# Patient Record
Sex: MALE | Race: WHITE | NOT HISPANIC OR LATINO | URBAN - METROPOLITAN AREA
[De-identification: names, ages, dates, MRNs, and addresses within clinical notes are randomized per-mention and may not be internally consistent; named-entity substitution may affect disease eponyms.]

---

## 2017-07-31 ENCOUNTER — OFFICE (OUTPATIENT)
Dept: URBAN - METROPOLITAN AREA CLINIC 11 | Facility: CLINIC | Age: 57
End: 2017-07-31

## 2017-07-31 VITALS
HEIGHT: 64 IN | DIASTOLIC BLOOD PRESSURE: 62 MMHG | WEIGHT: 156 LBS | SYSTOLIC BLOOD PRESSURE: 127 MMHG | HEART RATE: 48 BPM

## 2017-07-31 DIAGNOSIS — K21.9 GASTRO-ESOPHAGEAL REFLUX DISEASE WITHOUT ESOPHAGITIS: ICD-10-CM

## 2017-07-31 DIAGNOSIS — R14.0 ABDOMINAL DISTENSION (GASEOUS): ICD-10-CM

## 2017-07-31 PROCEDURE — 99213 OFFICE O/P EST LOW 20 MIN: CPT | Performed by: INTERNAL MEDICINE

## 2018-07-23 ENCOUNTER — OFFICE (OUTPATIENT)
Dept: URBAN - METROPOLITAN AREA CLINIC 11 | Facility: CLINIC | Age: 58
End: 2018-07-23

## 2018-07-23 VITALS
WEIGHT: 160 LBS | DIASTOLIC BLOOD PRESSURE: 72 MMHG | SYSTOLIC BLOOD PRESSURE: 140 MMHG | HEIGHT: 64 IN | HEART RATE: 48 BPM

## 2018-07-23 DIAGNOSIS — R14.0 ABDOMINAL DISTENSION (GASEOUS): ICD-10-CM

## 2018-07-23 DIAGNOSIS — K21.9 GASTRO-ESOPHAGEAL REFLUX DISEASE WITHOUT ESOPHAGITIS: ICD-10-CM

## 2018-07-23 DIAGNOSIS — K44.9 DIAPHRAGMATIC HERNIA WITHOUT OBSTRUCTION OR GANGRENE: ICD-10-CM

## 2018-07-23 PROCEDURE — 99213 OFFICE O/P EST LOW 20 MIN: CPT | Performed by: INTERNAL MEDICINE

## 2018-07-23 RX ORDER — SUCRALFATE 1 G/1
TABLET ORAL
Qty: 180 | Refills: 3 | Status: ACTIVE

## 2018-07-23 RX ORDER — PANTOPRAZOLE 40 MG/1
TABLET, DELAYED RELEASE ORAL
Qty: 90 | Refills: 3 | Status: ACTIVE

## 2018-07-23 NOTE — SERVICEHPINOTES
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  Status post hiatal hernia repair many years ago.  Recently hospitalized while on vacation with  probable upper GI bleeding severe anemia.  Endoscopy with a hiatal hernia and Bustillo's esophagus.  No active bleeding noted.  Given 2 units of packed red blood cells.  Discharged home on sucralfate and Protonix as well as MiraLax.  Feeling better

## 2018-10-17 ENCOUNTER — OFFICE (OUTPATIENT)
Dept: URBAN - METROPOLITAN AREA CLINIC 11 | Facility: CLINIC | Age: 58
End: 2018-10-17

## 2018-10-17 VITALS
DIASTOLIC BLOOD PRESSURE: 90 MMHG | HEIGHT: 64 IN | HEART RATE: 82 BPM | SYSTOLIC BLOOD PRESSURE: 130 MMHG | WEIGHT: 158 LBS

## 2018-10-17 DIAGNOSIS — K21.9 GASTRO-ESOPHAGEAL REFLUX DISEASE WITHOUT ESOPHAGITIS: ICD-10-CM

## 2018-10-17 DIAGNOSIS — K44.9 DIAPHRAGMATIC HERNIA WITHOUT OBSTRUCTION OR GANGRENE: ICD-10-CM

## 2018-10-17 DIAGNOSIS — E53.8 DEFICIENCY OF OTHER SPECIFIED B GROUP VITAMINS: ICD-10-CM

## 2018-10-17 DIAGNOSIS — D64.9 ANEMIA, UNSPECIFIED: ICD-10-CM

## 2018-10-17 DIAGNOSIS — R14.0 ABDOMINAL DISTENSION (GASEOUS): ICD-10-CM

## 2018-10-17 LAB
CBC, PLATELET, NO DIFFERENTIAL: HEMATOCRIT: 44.1 % (ref 37.5–51)
CBC, PLATELET, NO DIFFERENTIAL: HEMOGLOBIN: 14.7 G/DL (ref 13–17.7)
CBC, PLATELET, NO DIFFERENTIAL: MCH: 29.1 PG (ref 26.6–33)
CBC, PLATELET, NO DIFFERENTIAL: MCHC: 33.3 G/DL (ref 31.5–35.7)
CBC, PLATELET, NO DIFFERENTIAL: MCV: 87 FL (ref 79–97)
CBC, PLATELET, NO DIFFERENTIAL: PLATELETS: 216 X10E3/UL (ref 150–379)
CBC, PLATELET, NO DIFFERENTIAL: RBC: 5.06 X10E6/UL (ref 4.14–5.8)
CBC, PLATELET, NO DIFFERENTIAL: RDW: 13.9 % (ref 12.3–15.4)
CBC, PLATELET, NO DIFFERENTIAL: WBC: 5.7 X10E3/UL (ref 3.4–10.8)
FE+TIBC+FER: FERRITIN, SERUM: 20 NG/ML — LOW (ref 30–400)
FE+TIBC+FER: IRON BIND.CAP.(TIBC): 414 UG/DL (ref 250–450)
FE+TIBC+FER: IRON SATURATION: 18 % (ref 15–55)
FE+TIBC+FER: IRON: 76 UG/DL (ref 38–169)
FE+TIBC+FER: UIBC: 338 UG/DL (ref 111–343)
VITAMIN B12 AND FOLATE: FOLATE (FOLIC ACID), SERUM: 9.1 NG/ML (ref 3–?)
VITAMIN B12 AND FOLATE: VITAMIN B12: 256 PG/ML (ref 232–1245)

## 2018-10-17 PROCEDURE — 99213 OFFICE O/P EST LOW 20 MIN: CPT | Mod: 25 | Performed by: INTERNAL MEDICINE

## 2018-10-17 PROCEDURE — 36415 COLL VENOUS BLD VENIPUNCTURE: CPT | Performed by: INTERNAL MEDICINE

## 2019-02-20 ENCOUNTER — OFFICE (OUTPATIENT)
Dept: URBAN - METROPOLITAN AREA CLINIC 11 | Facility: CLINIC | Age: 59
End: 2019-02-20

## 2019-02-20 VITALS
HEART RATE: 45 BPM | SYSTOLIC BLOOD PRESSURE: 148 MMHG | DIASTOLIC BLOOD PRESSURE: 80 MMHG | HEIGHT: 64 IN | WEIGHT: 156 LBS

## 2019-02-20 DIAGNOSIS — E53.8 DEFICIENCY OF OTHER SPECIFIED B GROUP VITAMINS: ICD-10-CM

## 2019-02-20 DIAGNOSIS — K22.70 BARRETT'S ESOPHAGUS WITHOUT DYSPLASIA: ICD-10-CM

## 2019-02-20 DIAGNOSIS — D64.9 ANEMIA, UNSPECIFIED: ICD-10-CM

## 2019-02-20 DIAGNOSIS — K44.9 DIAPHRAGMATIC HERNIA WITHOUT OBSTRUCTION OR GANGRENE: ICD-10-CM

## 2019-02-20 DIAGNOSIS — R14.0 ABDOMINAL DISTENSION (GASEOUS): ICD-10-CM

## 2019-02-20 LAB
CBC, PLATELET, NO DIFFERENTIAL: HEMATOCRIT: 42.2 % (ref 37.5–51)
CBC, PLATELET, NO DIFFERENTIAL: HEMOGLOBIN: 14.2 G/DL (ref 13–17.7)
CBC, PLATELET, NO DIFFERENTIAL: MCH: 29.9 PG (ref 26.6–33)
CBC, PLATELET, NO DIFFERENTIAL: MCHC: 33.6 G/DL (ref 31.5–35.7)
CBC, PLATELET, NO DIFFERENTIAL: MCV: 89 FL (ref 79–97)
CBC, PLATELET, NO DIFFERENTIAL: PLATELETS: 222 X10E3/UL (ref 150–379)
CBC, PLATELET, NO DIFFERENTIAL: RBC: 4.75 X10E6/UL (ref 4.14–5.8)
CBC, PLATELET, NO DIFFERENTIAL: RDW: 13.9 % (ref 12.3–15.4)
CBC, PLATELET, NO DIFFERENTIAL: WBC: 5.8 X10E3/UL (ref 3.4–10.8)
FE+TIBC+FER: FERRITIN, SERUM: 32 NG/ML (ref 30–400)
FE+TIBC+FER: IRON BIND.CAP.(TIBC): 353 UG/DL (ref 250–450)
FE+TIBC+FER: IRON SATURATION: 29 % (ref 15–55)
FE+TIBC+FER: IRON: 104 UG/DL (ref 38–169)
FE+TIBC+FER: UIBC: 249 UG/DL (ref 111–343)

## 2019-02-20 PROCEDURE — 99213 OFFICE O/P EST LOW 20 MIN: CPT | Performed by: INTERNAL MEDICINE

## 2019-02-20 RX ORDER — SUCRALFATE 1 G/1
TABLET ORAL
Qty: 180 | Refills: 3 | Status: ACTIVE

## 2019-02-20 RX ORDER — PANTOPRAZOLE 40 MG/1
TABLET, DELAYED RELEASE ORAL
Qty: 90 | Refills: 3 | Status: ACTIVE

## 2019-08-21 ENCOUNTER — OFFICE (OUTPATIENT)
Dept: URBAN - METROPOLITAN AREA CLINIC 11 | Facility: CLINIC | Age: 59
End: 2019-08-21

## 2019-08-21 VITALS
DIASTOLIC BLOOD PRESSURE: 75 MMHG | HEART RATE: 42 BPM | SYSTOLIC BLOOD PRESSURE: 140 MMHG | WEIGHT: 158 LBS | HEIGHT: 64 IN

## 2019-08-21 DIAGNOSIS — E53.8 DEFICIENCY OF OTHER SPECIFIED B GROUP VITAMINS: ICD-10-CM

## 2019-08-21 DIAGNOSIS — R14.0 ABDOMINAL DISTENSION (GASEOUS): ICD-10-CM

## 2019-08-21 DIAGNOSIS — G40.909 EPILEPSY, UNSPECIFIED, NOT INTRACTABLE, WITHOUT STATUS EPILE: ICD-10-CM

## 2019-08-21 DIAGNOSIS — K21.9 GASTRO-ESOPHAGEAL REFLUX DISEASE WITHOUT ESOPHAGITIS: ICD-10-CM

## 2019-08-21 DIAGNOSIS — K44.9 DIAPHRAGMATIC HERNIA WITHOUT OBSTRUCTION OR GANGRENE: ICD-10-CM

## 2019-08-21 DIAGNOSIS — K22.70 BARRETT'S ESOPHAGUS WITHOUT DYSPLASIA: ICD-10-CM

## 2019-08-21 DIAGNOSIS — D64.9 ANEMIA, UNSPECIFIED: ICD-10-CM

## 2019-08-21 LAB
CBC, PLATELET, NO DIFFERENTIAL: HEMATOCRIT: 44 % (ref 37.5–51)
CBC, PLATELET, NO DIFFERENTIAL: HEMOGLOBIN: 14.4 G/DL (ref 13–17.7)
CBC, PLATELET, NO DIFFERENTIAL: MCH: 29.9 PG (ref 26.6–33)
CBC, PLATELET, NO DIFFERENTIAL: MCHC: 32.7 G/DL (ref 31.5–35.7)
CBC, PLATELET, NO DIFFERENTIAL: MCV: 91 FL (ref 79–97)
CBC, PLATELET, NO DIFFERENTIAL: PLATELETS: 238 X10E3/UL (ref 150–450)
CBC, PLATELET, NO DIFFERENTIAL: RBC: 4.82 X10E6/UL (ref 4.14–5.8)
CBC, PLATELET, NO DIFFERENTIAL: RDW: 13.6 % (ref 12.3–15.4)
CBC, PLATELET, NO DIFFERENTIAL: WBC: 6 X10E3/UL (ref 3.4–10.8)
FE+TIBC+FER: FERRITIN, SERUM: 43 NG/ML (ref 30–400)
FE+TIBC+FER: IRON BIND.CAP.(TIBC): 356 UG/DL (ref 250–450)
FE+TIBC+FER: IRON SATURATION: 50 % (ref 15–55)
FE+TIBC+FER: IRON: 178 UG/DL — HIGH (ref 38–169)
FE+TIBC+FER: UIBC: 178 UG/DL (ref 111–343)
HEPATIC FUNCTION PANEL (7): ALBUMIN: 5 G/DL — HIGH (ref 3.6–4.8)
HEPATIC FUNCTION PANEL (7): ALKALINE PHOSPHATASE: 83 IU/L (ref 39–117)
HEPATIC FUNCTION PANEL (7): ALT (SGPT): 22 IU/L (ref 0–44)
HEPATIC FUNCTION PANEL (7): AST (SGOT): 23 IU/L (ref 0–40)
HEPATIC FUNCTION PANEL (7): BILIRUBIN, DIRECT: 0.11 MG/DL (ref 0–0.4)
HEPATIC FUNCTION PANEL (7): BILIRUBIN, TOTAL: 0.3 MG/DL (ref 0–1.2)
HEPATIC FUNCTION PANEL (7): PROTEIN, TOTAL: 7.6 G/DL (ref 6–8.5)

## 2019-08-21 PROCEDURE — 99213 OFFICE O/P EST LOW 20 MIN: CPT | Performed by: INTERNAL MEDICINE

## 2020-02-21 ENCOUNTER — OFFICE (OUTPATIENT)
Dept: URBAN - METROPOLITAN AREA CLINIC 11 | Facility: CLINIC | Age: 60
End: 2020-02-21

## 2020-02-21 VITALS
DIASTOLIC BLOOD PRESSURE: 61 MMHG | HEIGHT: 64 IN | SYSTOLIC BLOOD PRESSURE: 156 MMHG | WEIGHT: 167 LBS | HEART RATE: 53 BPM

## 2020-02-21 DIAGNOSIS — R14.0 ABDOMINAL DISTENSION (GASEOUS): ICD-10-CM

## 2020-02-21 DIAGNOSIS — E53.8 DEFICIENCY OF OTHER SPECIFIED B GROUP VITAMINS: ICD-10-CM

## 2020-02-21 DIAGNOSIS — K44.9 DIAPHRAGMATIC HERNIA WITHOUT OBSTRUCTION OR GANGRENE: ICD-10-CM

## 2020-02-21 DIAGNOSIS — D64.9 ANEMIA, UNSPECIFIED: ICD-10-CM

## 2020-02-21 DIAGNOSIS — K22.70 BARRETT'S ESOPHAGUS WITHOUT DYSPLASIA: ICD-10-CM

## 2020-02-21 LAB
BASIC METABOLIC PANEL (8): BUN/CREATININE RATIO: 22 (ref 10–24)
BASIC METABOLIC PANEL (8): BUN: 19 MG/DL (ref 8–27)
BASIC METABOLIC PANEL (8): CALCIUM: 9.5 MG/DL (ref 8.6–10.2)
BASIC METABOLIC PANEL (8): CARBON DIOXIDE, TOTAL: 21 MMOL/L (ref 20–29)
BASIC METABOLIC PANEL (8): CHLORIDE: 104 MMOL/L (ref 96–106)
BASIC METABOLIC PANEL (8): CREATININE: 0.86 MG/DL (ref 0.76–1.27)
BASIC METABOLIC PANEL (8): EGFR IF AFRICN AM: 108 ML/MIN/1.73 (ref 59–?)
BASIC METABOLIC PANEL (8): EGFR IF NONAFRICN AM: 94 ML/MIN/1.73 (ref 59–?)
BASIC METABOLIC PANEL (8): GLUCOSE: 88 MG/DL (ref 65–99)
BASIC METABOLIC PANEL (8): POTASSIUM: 4.7 MMOL/L (ref 3.5–5.2)
BASIC METABOLIC PANEL (8): SODIUM: 141 MMOL/L (ref 134–144)
CBC, PLATELET, NO DIFFERENTIAL: HEMATOCRIT: 42.2 % (ref 37.5–51)
CBC, PLATELET, NO DIFFERENTIAL: HEMOGLOBIN: 14 G/DL (ref 13–17.7)
CBC, PLATELET, NO DIFFERENTIAL: MCH: 30.2 PG (ref 26.6–33)
CBC, PLATELET, NO DIFFERENTIAL: MCHC: 33.2 G/DL (ref 31.5–35.7)
CBC, PLATELET, NO DIFFERENTIAL: MCV: 91 FL (ref 79–97)
CBC, PLATELET, NO DIFFERENTIAL: PLATELETS: 235 X10E3/UL (ref 150–450)
CBC, PLATELET, NO DIFFERENTIAL: RBC: 4.63 X10E6/UL (ref 4.14–5.8)
CBC, PLATELET, NO DIFFERENTIAL: RDW: 14 % (ref 11.6–15.4)
CBC, PLATELET, NO DIFFERENTIAL: WBC: 7.2 X10E3/UL (ref 3.4–10.8)
FE+TIBC+FER: FERRITIN, SERUM: 49 NG/ML (ref 30–400)
FE+TIBC+FER: IRON BIND.CAP.(TIBC): 322 UG/DL (ref 250–450)
FE+TIBC+FER: IRON SATURATION: 34 % (ref 15–55)
FE+TIBC+FER: IRON: 109 UG/DL (ref 38–169)
FE+TIBC+FER: UIBC: 213 UG/DL (ref 111–343)
VITAMIN B12 AND FOLATE: FOLATE (FOLIC ACID), SERUM: 9.4 NG/ML (ref 3–?)
VITAMIN B12 AND FOLATE: VITAMIN B12: >2000 PG/ML — HIGH

## 2020-02-21 PROCEDURE — 99213 OFFICE O/P EST LOW 20 MIN: CPT | Performed by: INTERNAL MEDICINE

## 2020-08-28 ENCOUNTER — OFFICE (OUTPATIENT)
Dept: URBAN - METROPOLITAN AREA CLINIC 11 | Facility: CLINIC | Age: 60
End: 2020-08-28
Payer: MEDICARE

## 2020-08-28 VITALS
SYSTOLIC BLOOD PRESSURE: 161 MMHG | OXYGEN SATURATION: 99 % | DIASTOLIC BLOOD PRESSURE: 98 MMHG | WEIGHT: 158 LBS | HEART RATE: 71 BPM | HEIGHT: 64 IN

## 2020-08-28 DIAGNOSIS — R14.0 ABDOMINAL DISTENSION (GASEOUS): ICD-10-CM

## 2020-08-28 DIAGNOSIS — D64.9 ANEMIA, UNSPECIFIED: ICD-10-CM

## 2020-08-28 DIAGNOSIS — E53.8 DEFICIENCY OF OTHER SPECIFIED B GROUP VITAMINS: ICD-10-CM

## 2020-08-28 DIAGNOSIS — K22.70 BARRETT'S ESOPHAGUS WITHOUT DYSPLASIA: ICD-10-CM

## 2020-08-28 LAB
CBC, PLATELET, NO DIFFERENTIAL: HEMATOCRIT: 40.9 % (ref 37.5–51)
CBC, PLATELET, NO DIFFERENTIAL: HEMOGLOBIN: 14.2 G/DL (ref 13–17.7)
CBC, PLATELET, NO DIFFERENTIAL: MCH: 31.1 PG (ref 26.6–33)
CBC, PLATELET, NO DIFFERENTIAL: MCHC: 34.7 G/DL (ref 31.5–35.7)
CBC, PLATELET, NO DIFFERENTIAL: MCV: 90 FL (ref 79–97)
CBC, PLATELET, NO DIFFERENTIAL: NRBC: (no result)
CBC, PLATELET, NO DIFFERENTIAL: PLATELETS: 228 X10E3/UL (ref 150–450)
CBC, PLATELET, NO DIFFERENTIAL: RBC: 4.57 X10E6/UL (ref 4.14–5.8)
CBC, PLATELET, NO DIFFERENTIAL: RDW: 12.2 % (ref 11.6–15.4)
CBC, PLATELET, NO DIFFERENTIAL: WBC: 5.6 X10E3/UL (ref 3.4–10.8)

## 2020-08-28 PROCEDURE — 99214 OFFICE O/P EST MOD 30 MIN: CPT | Mod: 25 | Performed by: INTERNAL MEDICINE

## 2020-08-28 PROCEDURE — 96372 THER/PROPH/DIAG INJ SC/IM: CPT | Performed by: INTERNAL MEDICINE

## 2021-03-03 ENCOUNTER — OFFICE (OUTPATIENT)
Dept: URBAN - METROPOLITAN AREA CLINIC 11 | Facility: CLINIC | Age: 61
End: 2021-03-03

## 2021-03-03 VITALS
OXYGEN SATURATION: 97 % | SYSTOLIC BLOOD PRESSURE: 122 MMHG | HEIGHT: 64 IN | HEART RATE: 78 BPM | DIASTOLIC BLOOD PRESSURE: 88 MMHG | WEIGHT: 163 LBS

## 2021-03-03 DIAGNOSIS — K21.9 GASTRO-ESOPHAGEAL REFLUX DISEASE WITHOUT ESOPHAGITIS: ICD-10-CM

## 2021-03-03 DIAGNOSIS — R14.0 ABDOMINAL DISTENSION (GASEOUS): ICD-10-CM

## 2021-03-03 DIAGNOSIS — G40.909 EPILEPSY, UNSPECIFIED, NOT INTRACTABLE, WITHOUT STATUS EPILE: ICD-10-CM

## 2021-03-03 DIAGNOSIS — K44.9 DIAPHRAGMATIC HERNIA WITHOUT OBSTRUCTION OR GANGRENE: ICD-10-CM

## 2021-03-03 DIAGNOSIS — K22.70 BARRETT'S ESOPHAGUS WITHOUT DYSPLASIA: ICD-10-CM

## 2021-03-03 DIAGNOSIS — E53.8 DEFICIENCY OF OTHER SPECIFIED B GROUP VITAMINS: ICD-10-CM

## 2021-03-03 PROCEDURE — 99214 OFFICE O/P EST MOD 30 MIN: CPT | Performed by: INTERNAL MEDICINE

## 2021-03-03 RX ORDER — PANTOPRAZOLE 40 MG/1
TABLET, DELAYED RELEASE ORAL
Qty: 90 | Refills: 3 | Status: ACTIVE

## 2021-03-03 RX ORDER — SUCRALFATE 1 G/1
TABLET ORAL
Qty: 180 | Refills: 3 | Status: ACTIVE

## 2021-08-23 ENCOUNTER — OFFICE (OUTPATIENT)
Dept: URBAN - METROPOLITAN AREA PATHOLOGY 22 | Facility: PATHOLOGY | Age: 61
End: 2021-08-23
Payer: MEDICARE

## 2021-08-23 ENCOUNTER — AMBULATORY SURGICAL CENTER (OUTPATIENT)
Dept: URBAN - METROPOLITAN AREA SURGERY 3 | Facility: SURGERY | Age: 61
End: 2021-08-23

## 2021-08-23 ENCOUNTER — AMBULATORY SURGICAL CENTER (OUTPATIENT)
Dept: URBAN - METROPOLITAN AREA SURGERY 3 | Facility: SURGERY | Age: 61
End: 2021-08-23
Payer: MEDICARE

## 2021-08-23 VITALS
RESPIRATION RATE: 19 BRPM | WEIGHT: 163 LBS | OXYGEN SATURATION: 90 % | OXYGEN SATURATION: 91 % | WEIGHT: 163 LBS | TEMPERATURE: 98 F | HEIGHT: 64 IN | OXYGEN SATURATION: 90 % | OXYGEN SATURATION: 98 % | DIASTOLIC BLOOD PRESSURE: 73 MMHG | SYSTOLIC BLOOD PRESSURE: 158 MMHG | RESPIRATION RATE: 20 BRPM | OXYGEN SATURATION: 91 % | DIASTOLIC BLOOD PRESSURE: 72 MMHG | RESPIRATION RATE: 18 BRPM | DIASTOLIC BLOOD PRESSURE: 89 MMHG | HEART RATE: 80 BPM | SYSTOLIC BLOOD PRESSURE: 116 MMHG | SYSTOLIC BLOOD PRESSURE: 116 MMHG | SYSTOLIC BLOOD PRESSURE: 147 MMHG | HEART RATE: 89 BPM | DIASTOLIC BLOOD PRESSURE: 83 MMHG | DIASTOLIC BLOOD PRESSURE: 89 MMHG | RESPIRATION RATE: 20 BRPM | SYSTOLIC BLOOD PRESSURE: 144 MMHG | OXYGEN SATURATION: 89 % | SYSTOLIC BLOOD PRESSURE: 147 MMHG | RESPIRATION RATE: 20 BRPM | OXYGEN SATURATION: 100 % | DIASTOLIC BLOOD PRESSURE: 83 MMHG | DIASTOLIC BLOOD PRESSURE: 73 MMHG | TEMPERATURE: 97.3 F | OXYGEN SATURATION: 98 % | TEMPERATURE: 98 F | DIASTOLIC BLOOD PRESSURE: 72 MMHG | SYSTOLIC BLOOD PRESSURE: 158 MMHG | OXYGEN SATURATION: 100 % | RESPIRATION RATE: 19 BRPM | HEART RATE: 89 BPM | HEART RATE: 80 BPM | HEART RATE: 80 BPM | WEIGHT: 163 LBS | DIASTOLIC BLOOD PRESSURE: 88 MMHG | OXYGEN SATURATION: 91 % | HEIGHT: 64 IN | RESPIRATION RATE: 19 BRPM | RESPIRATION RATE: 18 BRPM | DIASTOLIC BLOOD PRESSURE: 73 MMHG | HEART RATE: 89 BPM | DIASTOLIC BLOOD PRESSURE: 72 MMHG | OXYGEN SATURATION: 98 % | DIASTOLIC BLOOD PRESSURE: 89 MMHG | OXYGEN SATURATION: 90 % | SYSTOLIC BLOOD PRESSURE: 144 MMHG | OXYGEN SATURATION: 89 % | SYSTOLIC BLOOD PRESSURE: 147 MMHG | SYSTOLIC BLOOD PRESSURE: 158 MMHG | TEMPERATURE: 98 F | SYSTOLIC BLOOD PRESSURE: 116 MMHG | SYSTOLIC BLOOD PRESSURE: 125 MMHG | SYSTOLIC BLOOD PRESSURE: 125 MMHG | DIASTOLIC BLOOD PRESSURE: 88 MMHG | HEART RATE: 60 BPM | DIASTOLIC BLOOD PRESSURE: 83 MMHG | HEART RATE: 60 BPM | OXYGEN SATURATION: 100 % | HEART RATE: 97 BPM | RESPIRATION RATE: 18 BRPM | TEMPERATURE: 97.3 F | TEMPERATURE: 97.3 F | SYSTOLIC BLOOD PRESSURE: 125 MMHG | HEART RATE: 97 BPM | OXYGEN SATURATION: 89 % | HEART RATE: 97 BPM | DIASTOLIC BLOOD PRESSURE: 88 MMHG | HEART RATE: 60 BPM | SYSTOLIC BLOOD PRESSURE: 144 MMHG | HEIGHT: 64 IN

## 2021-08-23 DIAGNOSIS — K22.70 BARRETT'S ESOPHAGUS WITHOUT DYSPLASIA: ICD-10-CM

## 2021-08-23 DIAGNOSIS — K21.00 GASTRO-ESOPHAGEAL REFLUX DISEASE WITH ESOPHAGITIS, WITHOUT B: ICD-10-CM

## 2021-08-23 DIAGNOSIS — K44.9 DIAPHRAGMATIC HERNIA WITHOUT OBSTRUCTION OR GANGRENE: ICD-10-CM

## 2021-08-23 PROCEDURE — 88305 TISSUE EXAM BY PATHOLOGIST: CPT | Performed by: INTERNAL MEDICINE

## 2021-08-23 PROCEDURE — 43239 EGD BIOPSY SINGLE/MULTIPLE: CPT | Performed by: INTERNAL MEDICINE

## 2022-09-28 ENCOUNTER — OFFICE (OUTPATIENT)
Dept: URBAN - METROPOLITAN AREA CLINIC 11 | Facility: CLINIC | Age: 62
End: 2022-09-28

## 2022-09-28 VITALS
DIASTOLIC BLOOD PRESSURE: 59 MMHG | HEART RATE: 56 BPM | WEIGHT: 162 LBS | SYSTOLIC BLOOD PRESSURE: 140 MMHG | HEIGHT: 64 IN | OXYGEN SATURATION: 56 %

## 2022-09-28 DIAGNOSIS — K22.70 BARRETT'S ESOPHAGUS WITHOUT DYSPLASIA: ICD-10-CM

## 2022-09-28 DIAGNOSIS — D64.9 ANEMIA, UNSPECIFIED: ICD-10-CM

## 2022-09-28 DIAGNOSIS — K63.89 OTHER SPECIFIED DISEASES OF INTESTINE: ICD-10-CM

## 2022-09-28 DIAGNOSIS — K44.9 DIAPHRAGMATIC HERNIA WITHOUT OBSTRUCTION OR GANGRENE: ICD-10-CM

## 2022-09-28 LAB
CBC, PLATELET, NO DIFFERENTIAL: HEMATOCRIT: 42 % (ref 37.5–51)
CBC, PLATELET, NO DIFFERENTIAL: HEMOGLOBIN: 14 G/DL (ref 13–17.7)
CBC, PLATELET, NO DIFFERENTIAL: MCH: 30.2 PG (ref 26.6–33)
CBC, PLATELET, NO DIFFERENTIAL: MCHC: 33.3 G/DL (ref 31.5–35.7)
CBC, PLATELET, NO DIFFERENTIAL: MCV: 91 FL (ref 79–97)
CBC, PLATELET, NO DIFFERENTIAL: NRBC: (no result)
CBC, PLATELET, NO DIFFERENTIAL: PLATELETS: 235 X10E3/UL (ref 150–450)
CBC, PLATELET, NO DIFFERENTIAL: RBC: 4.64 X10E6/UL (ref 4.14–5.8)
CBC, PLATELET, NO DIFFERENTIAL: RDW: 12.5 % (ref 11.6–15.4)
CBC, PLATELET, NO DIFFERENTIAL: WBC: 5.9 X10E3/UL (ref 3.4–10.8)

## 2022-09-28 PROCEDURE — 99214 OFFICE O/P EST MOD 30 MIN: CPT | Performed by: INTERNAL MEDICINE

## 2022-09-28 RX ORDER — PANTOPRAZOLE 40 MG/1
TABLET, DELAYED RELEASE ORAL
Qty: 90 | Refills: 3 | Status: ACTIVE

## 2022-09-28 RX ORDER — SUCRALFATE 1 G/1
TABLET ORAL
Qty: 180 | Refills: 3 | Status: ACTIVE

## 2023-11-15 ENCOUNTER — OFFICE (OUTPATIENT)
Dept: URBAN - METROPOLITAN AREA CLINIC 11 | Facility: CLINIC | Age: 63
End: 2023-11-15

## 2023-11-15 VITALS
WEIGHT: 157.8 LBS | DIASTOLIC BLOOD PRESSURE: 69 MMHG | HEART RATE: 52 BPM | SYSTOLIC BLOOD PRESSURE: 144 MMHG | HEIGHT: 64 IN | OXYGEN SATURATION: 99 %

## 2023-11-15 DIAGNOSIS — R14.0 ABDOMINAL DISTENSION (GASEOUS): ICD-10-CM

## 2023-11-15 DIAGNOSIS — K63.89 OTHER SPECIFIED DISEASES OF INTESTINE: ICD-10-CM

## 2023-11-15 DIAGNOSIS — K22.70 BARRETT'S ESOPHAGUS WITHOUT DYSPLASIA: ICD-10-CM

## 2023-11-15 PROCEDURE — 99214 OFFICE O/P EST MOD 30 MIN: CPT | Performed by: INTERNAL MEDICINE

## 2023-11-15 RX ORDER — SUCRALFATE 1 G/1
TABLET ORAL
Qty: 180 | Refills: 3 | Status: ACTIVE

## 2023-11-15 RX ORDER — PANTOPRAZOLE 40 MG/1
TABLET, DELAYED RELEASE ORAL
Qty: 90 | Refills: 3 | Status: ACTIVE

## 2024-12-09 ENCOUNTER — OFFICE (OUTPATIENT)
Dept: URBAN - METROPOLITAN AREA CLINIC 11 | Facility: CLINIC | Age: 64
End: 2024-12-09

## 2024-12-09 VITALS
HEIGHT: 64 IN | DIASTOLIC BLOOD PRESSURE: 56 MMHG | OXYGEN SATURATION: 96 % | HEART RATE: 61 BPM | SYSTOLIC BLOOD PRESSURE: 136 MMHG | WEIGHT: 161 LBS

## 2024-12-09 DIAGNOSIS — R14.0 ABDOMINAL DISTENSION (GASEOUS): ICD-10-CM

## 2024-12-09 DIAGNOSIS — E53.8 DEFICIENCY OF OTHER SPECIFIED B GROUP VITAMINS: ICD-10-CM

## 2024-12-09 DIAGNOSIS — K44.9 DIAPHRAGMATIC HERNIA WITHOUT OBSTRUCTION OR GANGRENE: ICD-10-CM

## 2024-12-09 DIAGNOSIS — K59.81 OGILVIE SYNDROME: ICD-10-CM

## 2024-12-09 DIAGNOSIS — K22.70 BARRETT'S ESOPHAGUS WITHOUT DYSPLASIA: ICD-10-CM

## 2024-12-09 PROCEDURE — 99214 OFFICE O/P EST MOD 30 MIN: CPT | Performed by: INTERNAL MEDICINE

## 2025-01-31 ENCOUNTER — OFFICE (OUTPATIENT)
Dept: URBAN - METROPOLITAN AREA PATHOLOGY 12 | Facility: PATHOLOGY | Age: 65
End: 2025-01-31
Payer: MEDICARE

## 2025-01-31 ENCOUNTER — AMBULATORY SURGICAL CENTER (OUTPATIENT)
Dept: URBAN - METROPOLITAN AREA SURGERY 3 | Facility: SURGERY | Age: 65
End: 2025-01-31
Payer: MEDICARE

## 2025-01-31 VITALS
HEART RATE: 56 BPM | TEMPERATURE: 97.9 F | DIASTOLIC BLOOD PRESSURE: 82 MMHG | RESPIRATION RATE: 17 BRPM | SYSTOLIC BLOOD PRESSURE: 126 MMHG | TEMPERATURE: 97.8 F | RESPIRATION RATE: 16 BRPM | SYSTOLIC BLOOD PRESSURE: 111 MMHG | TEMPERATURE: 97.9 F | HEIGHT: 64 IN | SYSTOLIC BLOOD PRESSURE: 112 MMHG | HEART RATE: 60 BPM | OXYGEN SATURATION: 95 % | SYSTOLIC BLOOD PRESSURE: 111 MMHG | HEIGHT: 64 IN | OXYGEN SATURATION: 92 % | OXYGEN SATURATION: 96 % | SYSTOLIC BLOOD PRESSURE: 114 MMHG | RESPIRATION RATE: 16 BRPM | SYSTOLIC BLOOD PRESSURE: 114 MMHG | TEMPERATURE: 97.8 F | HEART RATE: 62 BPM | HEART RATE: 60 BPM | HEART RATE: 67 BPM | OXYGEN SATURATION: 93 % | OXYGEN SATURATION: 95 % | DIASTOLIC BLOOD PRESSURE: 69 MMHG | SYSTOLIC BLOOD PRESSURE: 125 MMHG | OXYGEN SATURATION: 92 % | HEART RATE: 55 BPM | HEART RATE: 55 BPM | SYSTOLIC BLOOD PRESSURE: 126 MMHG | DIASTOLIC BLOOD PRESSURE: 68 MMHG | DIASTOLIC BLOOD PRESSURE: 82 MMHG | HEART RATE: 62 BPM | SYSTOLIC BLOOD PRESSURE: 125 MMHG | RESPIRATION RATE: 17 BRPM | DIASTOLIC BLOOD PRESSURE: 68 MMHG | OXYGEN SATURATION: 96 % | HEART RATE: 67 BPM | HEART RATE: 56 BPM | DIASTOLIC BLOOD PRESSURE: 65 MMHG | SYSTOLIC BLOOD PRESSURE: 112 MMHG | WEIGHT: 156 LBS | DIASTOLIC BLOOD PRESSURE: 65 MMHG | DIASTOLIC BLOOD PRESSURE: 69 MMHG | OXYGEN SATURATION: 93 % | WEIGHT: 156 LBS

## 2025-01-31 DIAGNOSIS — K22.70 BARRETT'S ESOPHAGUS WITHOUT DYSPLASIA: ICD-10-CM

## 2025-01-31 DIAGNOSIS — K44.9 DIAPHRAGMATIC HERNIA WITHOUT OBSTRUCTION OR GANGRENE: ICD-10-CM

## 2025-01-31 PROCEDURE — 43239 EGD BIOPSY SINGLE/MULTIPLE: CPT | Performed by: INTERNAL MEDICINE

## 2025-01-31 PROCEDURE — 88305 TISSUE EXAM BY PATHOLOGIST: CPT | Performed by: STUDENT IN AN ORGANIZED HEALTH CARE EDUCATION/TRAINING PROGRAM

## 2025-02-03 LAB
GASTRO ONE PATHOLOGY: PDF REPORT: (no result)
GASTRO ONE PATHOLOGY: PDF REPORT: (no result)